# Patient Record
Sex: FEMALE | Race: WHITE | URBAN - METROPOLITAN AREA
[De-identification: names, ages, dates, MRNs, and addresses within clinical notes are randomized per-mention and may not be internally consistent; named-entity substitution may affect disease eponyms.]

---

## 2019-11-07 ENCOUNTER — APPOINTMENT (RX ONLY)
Dept: URBAN - METROPOLITAN AREA CLINIC 166 | Facility: CLINIC | Age: 39
Setting detail: DERMATOLOGY
End: 2019-11-07

## 2019-11-07 DIAGNOSIS — D22 MELANOCYTIC NEVI: ICD-10-CM

## 2019-11-07 DIAGNOSIS — Z71.89 OTHER SPECIFIED COUNSELING: ICD-10-CM

## 2019-11-07 DIAGNOSIS — L57.0 ACTINIC KERATOSIS: ICD-10-CM

## 2019-11-07 DIAGNOSIS — Z85.820 PERSONAL HISTORY OF MALIGNANT MELANOMA OF SKIN: ICD-10-CM

## 2019-11-07 DIAGNOSIS — D18.0 HEMANGIOMA: ICD-10-CM

## 2019-11-07 DIAGNOSIS — L82.0 INFLAMED SEBORRHEIC KERATOSIS: ICD-10-CM

## 2019-11-07 DIAGNOSIS — L82.1 OTHER SEBORRHEIC KERATOSIS: ICD-10-CM

## 2019-11-07 DIAGNOSIS — L81.4 OTHER MELANIN HYPERPIGMENTATION: ICD-10-CM

## 2019-11-07 PROBLEM — D22.72 MELANOCYTIC NEVI OF LEFT LOWER LIMB, INCLUDING HIP: Status: ACTIVE | Noted: 2019-11-07

## 2019-11-07 PROBLEM — D22.71 MELANOCYTIC NEVI OF RIGHT LOWER LIMB, INCLUDING HIP: Status: ACTIVE | Noted: 2019-11-07

## 2019-11-07 PROBLEM — D22.62 MELANOCYTIC NEVI OF LEFT UPPER LIMB, INCLUDING SHOULDER: Status: ACTIVE | Noted: 2019-11-07

## 2019-11-07 PROBLEM — D22.61 MELANOCYTIC NEVI OF RIGHT UPPER LIMB, INCLUDING SHOULDER: Status: ACTIVE | Noted: 2019-11-07

## 2019-11-07 PROBLEM — D18.01 HEMANGIOMA OF SKIN AND SUBCUTANEOUS TISSUE: Status: ACTIVE | Noted: 2019-11-07

## 2019-11-07 PROBLEM — D22.5 MELANOCYTIC NEVI OF TRUNK: Status: ACTIVE | Noted: 2019-11-07

## 2019-11-07 PROCEDURE — ? LIQUID NITROGEN

## 2019-11-07 PROCEDURE — 17110 DESTRUCTION B9 LES UP TO 14: CPT

## 2019-11-07 PROCEDURE — ? COUNSELING

## 2019-11-07 PROCEDURE — ? PRESCRIPTION

## 2019-11-07 PROCEDURE — 99203 OFFICE O/P NEW LOW 30 MIN: CPT | Mod: 25

## 2019-11-07 PROCEDURE — ? TREATMENT REGIMEN

## 2019-11-07 RX ORDER — FLUOROURACIL 2 G/40G
CREAM TOPICAL
Qty: 1 | Refills: 1 | Status: ERX | COMMUNITY
Start: 2019-11-07

## 2019-11-07 RX ADMIN — FLUOROURACIL: 2 CREAM TOPICAL at 00:00

## 2019-11-07 ASSESSMENT — LOCATION DETAILED DESCRIPTION DERM
LOCATION DETAILED: RIGHT MID-UPPER BACK
LOCATION DETAILED: RIGHT PROXIMAL POSTERIOR UPPER ARM
LOCATION DETAILED: RIGHT SUPERIOR MEDIAL UPPER BACK
LOCATION DETAILED: LEFT DISTAL CALF
LOCATION DETAILED: UPPER STERNUM
LOCATION DETAILED: LEFT POSTERIOR SHOULDER
LOCATION DETAILED: RIGHT MEDIAL UPPER BACK
LOCATION DETAILED: SUPERIOR THORACIC SPINE
LOCATION DETAILED: PERIUMBILICAL SKIN
LOCATION DETAILED: RIGHT SUPERIOR MEDIAL MIDBACK
LOCATION DETAILED: RIGHT POSTERIOR SHOULDER
LOCATION DETAILED: NASAL DORSUM
LOCATION DETAILED: RIGHT INFERIOR UPPER BACK
LOCATION DETAILED: LEFT PROXIMAL POSTERIOR UPPER ARM
LOCATION DETAILED: RIGHT DISTAL CALF
LOCATION DETAILED: MID TRAPEZIAL NECK
LOCATION DETAILED: RIGHT LATERAL UPPER BACK
LOCATION DETAILED: LEFT SUPERIOR LATERAL UPPER BACK
LOCATION DETAILED: LEFT MID-UPPER BACK
LOCATION DETAILED: LEFT SUPERIOR MEDIAL MIDBACK
LOCATION DETAILED: LEFT MEDIAL UPPER BACK

## 2019-11-07 ASSESSMENT — LOCATION SIMPLE DESCRIPTION DERM
LOCATION SIMPLE: LEFT POSTERIOR UPPER ARM
LOCATION SIMPLE: RIGHT UPPER BACK
LOCATION SIMPLE: RIGHT POSTERIOR UPPER ARM
LOCATION SIMPLE: LEFT CALF
LOCATION SIMPLE: ABDOMEN
LOCATION SIMPLE: LEFT UPPER BACK
LOCATION SIMPLE: NOSE
LOCATION SIMPLE: CHEST
LOCATION SIMPLE: UPPER BACK
LOCATION SIMPLE: LEFT LOWER BACK
LOCATION SIMPLE: RIGHT LOWER BACK
LOCATION SIMPLE: RIGHT SHOULDER
LOCATION SIMPLE: LEFT SHOULDER
LOCATION SIMPLE: TRAPEZIAL NECK
LOCATION SIMPLE: RIGHT CALF

## 2019-11-07 ASSESSMENT — LOCATION ZONE DERM
LOCATION ZONE: NECK
LOCATION ZONE: ARM
LOCATION ZONE: LEG
LOCATION ZONE: NOSE
LOCATION ZONE: TRUNK

## 2019-11-07 NOTE — PROCEDURE: LIQUID NITROGEN
Include Z78.9 (Other Specified Conditions Influencing Health Status) As An Associated Diagnosis?: Yes
Consent: The patient's consent was obtained including but not limited to risks of crusting, scabbing, blistering, scarring, darker or lighter pigmentary change, recurrence, incomplete removal and infection.
Detail Level: Simple
Post-Care Instructions: I reviewed with the patient in detail post-care instructions. Patient is to wear sunprotection, and avoid picking at any of the treated lesions. Pt may apply Vaseline to crusted or scabbing areas.
Render Post-Care Instructions In Note?: no
Number Of Freeze-Thaw Cycles: 1 freeze-thaw cycle
Medical Necessity Information: It is in your best interest to select a reason for this procedure from the list below. All of these items fulfill various CMS LCD requirements except the new and changing color options.
Medical Necessity Clause: This procedure was medically necessary because the lesions that were treated were:irritated by picking

## 2019-11-07 NOTE — HPI: MELANOMA F/U (HISTORY OF MALIGNANT MELANOMA)
What Is The Reason For Today's Visit?: History of Melanoma
Year Excised?: Approximately 1999 per patient

## 2019-11-07 NOTE — PROCEDURE: TREATMENT REGIMEN
Initiate Treatment: Efudex 5% cream to the nose nightly x 3 weeks. Wash off in the morning.
Detail Level: Detailed

## 2021-05-21 ENCOUNTER — APPOINTMENT (RX ONLY)
Dept: URBAN - METROPOLITAN AREA CLINIC 170 | Facility: CLINIC | Age: 41
Setting detail: DERMATOLOGY
End: 2021-05-21

## 2021-05-21 ENCOUNTER — RX ONLY (OUTPATIENT)
Age: 41
Setting detail: RX ONLY
End: 2021-05-21

## 2021-05-21 DIAGNOSIS — Z85.820 PERSONAL HISTORY OF MALIGNANT MELANOMA OF SKIN: ICD-10-CM

## 2021-05-21 DIAGNOSIS — L57.0 ACTINIC KERATOSIS: ICD-10-CM | Status: INADEQUATELY CONTROLLED

## 2021-05-21 DIAGNOSIS — L81.4 OTHER MELANIN HYPERPIGMENTATION: ICD-10-CM

## 2021-05-21 DIAGNOSIS — L82.1 OTHER SEBORRHEIC KERATOSIS: ICD-10-CM

## 2021-05-21 DIAGNOSIS — D18.0 HEMANGIOMA: ICD-10-CM

## 2021-05-21 DIAGNOSIS — D22 MELANOCYTIC NEVI: ICD-10-CM

## 2021-05-21 PROBLEM — D22.71 MELANOCYTIC NEVI OF RIGHT LOWER LIMB, INCLUDING HIP: Status: ACTIVE | Noted: 2021-05-21

## 2021-05-21 PROBLEM — D22.72 MELANOCYTIC NEVI OF LEFT LOWER LIMB, INCLUDING HIP: Status: ACTIVE | Noted: 2021-05-21

## 2021-05-21 PROBLEM — D22.62 MELANOCYTIC NEVI OF LEFT UPPER LIMB, INCLUDING SHOULDER: Status: ACTIVE | Noted: 2021-05-21

## 2021-05-21 PROBLEM — D18.01 HEMANGIOMA OF SKIN AND SUBCUTANEOUS TISSUE: Status: ACTIVE | Noted: 2021-05-21

## 2021-05-21 PROBLEM — D22.61 MELANOCYTIC NEVI OF RIGHT UPPER LIMB, INCLUDING SHOULDER: Status: ACTIVE | Noted: 2021-05-21

## 2021-05-21 PROBLEM — D22.5 MELANOCYTIC NEVI OF TRUNK: Status: ACTIVE | Noted: 2021-05-21

## 2021-05-21 PROCEDURE — 99213 OFFICE O/P EST LOW 20 MIN: CPT

## 2021-05-21 PROCEDURE — ? TREATMENT REGIMEN

## 2021-05-21 PROCEDURE — ? MEDICAL PHOTOGRAPHY REVIEW

## 2021-05-21 PROCEDURE — ? COUNSELING

## 2021-05-21 RX ORDER — FLUOROURACIL 2 G/40G
CREAM TOPICAL
Qty: 1 | Refills: 0 | Status: ERX | COMMUNITY
Start: 2021-05-21

## 2021-05-21 ASSESSMENT — LOCATION ZONE DERM
LOCATION ZONE: ARM
LOCATION ZONE: LEG
LOCATION ZONE: TRUNK
LOCATION ZONE: NECK
LOCATION ZONE: NOSE

## 2021-05-21 ASSESSMENT — LOCATION DETAILED DESCRIPTION DERM
LOCATION DETAILED: SUPERIOR THORACIC SPINE
LOCATION DETAILED: LEFT PROXIMAL PRETIBIAL REGION
LOCATION DETAILED: RIGHT POSTERIOR SHOULDER
LOCATION DETAILED: RIGHT PROXIMAL POSTERIOR UPPER ARM
LOCATION DETAILED: INFERIOR THORACIC SPINE
LOCATION DETAILED: LEFT PROXIMAL POSTERIOR UPPER ARM
LOCATION DETAILED: RIGHT DISTAL CALF
LOCATION DETAILED: LEFT ANTERIOR PROXIMAL UPPER ARM
LOCATION DETAILED: NASAL DORSUM
LOCATION DETAILED: LEFT DISTAL CALF
LOCATION DETAILED: LEFT POSTERIOR SHOULDER
LOCATION DETAILED: MID TRAPEZIAL NECK
LOCATION DETAILED: RIGHT SUPERIOR MEDIAL UPPER BACK
LOCATION DETAILED: UPPER STERNUM
LOCATION DETAILED: RIGHT PROXIMAL PRETIBIAL REGION
LOCATION DETAILED: RIGHT ANTERIOR PROXIMAL UPPER ARM
LOCATION DETAILED: PERIUMBILICAL SKIN

## 2021-05-21 ASSESSMENT — LOCATION SIMPLE DESCRIPTION DERM
LOCATION SIMPLE: LEFT PRETIBIAL REGION
LOCATION SIMPLE: LEFT UPPER ARM
LOCATION SIMPLE: UPPER BACK
LOCATION SIMPLE: RIGHT CALF
LOCATION SIMPLE: RIGHT SHOULDER
LOCATION SIMPLE: NOSE
LOCATION SIMPLE: TRAPEZIAL NECK
LOCATION SIMPLE: CHEST
LOCATION SIMPLE: RIGHT PRETIBIAL REGION
LOCATION SIMPLE: ABDOMEN
LOCATION SIMPLE: RIGHT POSTERIOR UPPER ARM
LOCATION SIMPLE: RIGHT UPPER BACK
LOCATION SIMPLE: LEFT SHOULDER
LOCATION SIMPLE: RIGHT UPPER ARM
LOCATION SIMPLE: LEFT CALF
LOCATION SIMPLE: LEFT POSTERIOR UPPER ARM

## 2021-08-18 ENCOUNTER — APPOINTMENT (RX ONLY)
Dept: URBAN - METROPOLITAN AREA CLINIC 173 | Facility: CLINIC | Age: 41
Setting detail: DERMATOLOGY
End: 2021-08-18

## 2021-08-18 DIAGNOSIS — Z41.9 ENCOUNTER FOR PROCEDURE FOR PURPOSES OTHER THAN REMEDYING HEALTH STATE, UNSPECIFIED: ICD-10-CM

## 2021-08-18 PROCEDURE — ? PALOMAR ICON LASER

## 2021-08-18 PROCEDURE — ? OTHER (COSMETIC)

## 2021-08-18 PROCEDURE — ? COSMETIC CONSULTATION: FILLERS

## 2021-08-18 PROCEDURE — ? BOTOX

## 2021-08-18 PROCEDURE — ? COSMETIC CONSULTATION: BOTULINUM TOXIN

## 2021-08-18 PROCEDURE — ? TREATMENT REGIMEN

## 2021-08-18 PROCEDURE — ? COSMETIC CONSULTATION: IPL

## 2021-08-18 ASSESSMENT — LOCATION DETAILED DESCRIPTION DERM
LOCATION DETAILED: LEFT INFERIOR CENTRAL MALAR CHEEK
LOCATION DETAILED: LEFT INFERIOR ANTERIOR NECK
LOCATION DETAILED: MIDDLE STERNUM

## 2021-08-18 ASSESSMENT — LOCATION SIMPLE DESCRIPTION DERM
LOCATION SIMPLE: LEFT CHEEK
LOCATION SIMPLE: CHEST
LOCATION SIMPLE: LEFT ANTERIOR NECK

## 2021-08-18 ASSESSMENT — LOCATION ZONE DERM
LOCATION ZONE: NECK
LOCATION ZONE: TRUNK
LOCATION ZONE: FACE

## 2021-08-18 NOTE — PROCEDURE: OTHER (COSMETIC)
Other (Free Text): Patient advised to see Ophthalmology for possible Occular Rosacea , referring over to Dr. Judi Vogt.
Detail Level: Zone

## 2021-08-18 NOTE — PROCEDURE: BOTOX
Show Anterior Platysmal Band Units: Yes
Additional Area 2 Units: 0
Consent: Written consent obtained. Risks include but not limited to lid/brow ptosis, bruising, swelling, diplopia, temporary effect, incomplete chemical denervation.
Lot #: [id number]
Show Ucl Units: No
Detail Level: Zone
Additional Area 3 Location: Chin
Post-Care Instructions: Patient instructed to not lie down for 4 hours and limit physical activity for 24 hours. Patient instructed not to travel by airplane for 48 hours.
Dilution (U/0.1 Cc): 1
Additional Area 1 Location: Face
Additional Area 1 Units: 32
Expiration Date (Month Year): 10/23
Additional Area 2 Location: Upper cutaneous lip
Price (Use Numbers Only, No Special Characters Or $): 881

## 2021-08-18 NOTE — PROCEDURE: PALOMAR ICON LASER
Fluence: 26
Overlap: 10%
Location: full face
Detail Level: Zone
Post-Care Instructions: I reviewed with the patient in detail post-care instructions. Patient should stay away from the sun and wear sun protection until treated areas are fully healed.
Price (Use Numbers Only, No Special Characters Or $): 526
Fluence Units: J/cm2
Anesthesia Volume In Cc: 0
Treatment Number: 1
External Cooling Fan Speed: 5
Pulse Duration (In Milliseconds): 36
Location Override: mid cheeks
Pulse Duration (In Milliseconds): 20
Consent: Written consent obtained, risks reviewed including but not limited to crusting, scabbing, blistering, scarring, darker or lighter pigmentary change, bruising, and/or incomplete response.
Fluence: 20
Treated Area: small area
Render Post Care In The Note?: no
Pre-Procedure Text: The patients skin was cleaned and lux lotion applied.
Location: neck
Location: decolletage of the chest
Anesthesia Type: 1% lidocaine with epinephrine
Fluence: 24
External Cooling: Ron Cryo 6

## 2021-08-18 NOTE — HPI: COSMETIC (IPL)
Have You Had Laser Removal Of Brown Spots Before?: has not had previous treatment
When Outside In The Sun, Do You...: always burns, tans with difficulty
Eye Color: blue
MD//MARIN/LAVON

## 2021-08-18 NOTE — PROCEDURE: TREATMENT REGIMEN
Detail Level: Zone
Initiate Treatment: A.M. wash with a gentle cleanser ( Cerave ), Elta 44 non-tinted.\\nP.M. wash with gentle cleanser ( Cerave ), then apply Cerave P.M.\\n\\n- patient advised to use Aquaphor around eyes due to sensitivity and irritation.

## 2021-09-15 ENCOUNTER — APPOINTMENT (RX ONLY)
Dept: URBAN - METROPOLITAN AREA CLINIC 173 | Facility: CLINIC | Age: 41
Setting detail: DERMATOLOGY
End: 2021-09-15

## 2021-09-15 DIAGNOSIS — Z41.9 ENCOUNTER FOR PROCEDURE FOR PURPOSES OTHER THAN REMEDYING HEALTH STATE, UNSPECIFIED: ICD-10-CM

## 2021-09-15 DIAGNOSIS — L24 IRRITANT CONTACT DERMATITIS: ICD-10-CM

## 2021-09-15 PROBLEM — L24.9 IRRITANT CONTACT DERMATITIS, UNSPECIFIED CAUSE: Status: ACTIVE | Noted: 2021-09-15

## 2021-09-15 PROCEDURE — ? BOTOX

## 2021-09-15 PROCEDURE — ? PATIENT SPECIFIC COUNSELING

## 2021-09-15 ASSESSMENT — SEVERITY ASSESSMENT 2020: SEVERITY 2020: CLEAR

## 2021-09-15 ASSESSMENT — LOCATION SIMPLE DESCRIPTION DERM: LOCATION SIMPLE: INFERIOR FOREHEAD

## 2021-09-15 ASSESSMENT — LOCATION DETAILED DESCRIPTION DERM: LOCATION DETAILED: INFERIOR MID FOREHEAD

## 2021-09-15 ASSESSMENT — LOCATION ZONE DERM: LOCATION ZONE: FACE

## 2021-09-15 NOTE — PROCEDURE: BOTOX
Show Ucl Units: No
Levator Labii Superioris Units: 0
Additional Area 3 Location: Chin
Show Additional Area 6: Yes
Dilution (U/0.1 Cc): 1
Consent: Written consent obtained. Risks include but not limited to lid/brow ptosis, bruising, swelling, diplopia, temporary effect, incomplete chemical denervation.
Forehead Units: 0.5
Post-Care Instructions: Patient instructed to not lie down for 4 hours and limit physical activity for 24 hours. Patient instructed not to travel by airplane for 48 hours.
Additional Area 1 Location: Face
Expiration Date (Month Year): 10/23
Additional Area 2 Location: Upper cutaneous lip
Detail Level: Zone
Lot #: [id number]

## 2021-09-15 NOTE — PROCEDURE: PATIENT SPECIFIC COUNSELING
Resolved with minimization of products, and changing t fragrance free, gentle lines. Patient is washing with CeraVe Foaming Cleanser, using Elta SPF, and applying SkinBetter Trio at night
Detail Level: Zone

## 2021-09-22 ENCOUNTER — APPOINTMENT (RX ONLY)
Dept: URBAN - METROPOLITAN AREA CLINIC 173 | Facility: CLINIC | Age: 41
Setting detail: DERMATOLOGY
End: 2021-09-22

## 2021-09-22 DIAGNOSIS — Z41.9 ENCOUNTER FOR PROCEDURE FOR PURPOSES OTHER THAN REMEDYING HEALTH STATE, UNSPECIFIED: ICD-10-CM

## 2021-09-22 PROCEDURE — ? PALOMAR ICON LASER

## 2021-09-22 ASSESSMENT — LOCATION DETAILED DESCRIPTION DERM
LOCATION DETAILED: MIDDLE STERNUM
LOCATION DETAILED: LEFT INFERIOR CENTRAL MALAR CHEEK
LOCATION DETAILED: LEFT INFERIOR ANTERIOR NECK

## 2021-09-22 ASSESSMENT — LOCATION SIMPLE DESCRIPTION DERM
LOCATION SIMPLE: LEFT CHEEK
LOCATION SIMPLE: LEFT ANTERIOR NECK
LOCATION SIMPLE: CHEST

## 2021-09-22 ASSESSMENT — LOCATION ZONE DERM
LOCATION ZONE: FACE
LOCATION ZONE: TRUNK
LOCATION ZONE: NECK

## 2021-09-22 NOTE — PROCEDURE: PALOMAR ICON LASER
Fluence: 32
Overlap: 10%
Location: full face
Detail Level: Zone
Post-Care Instructions: I reviewed with the patient in detail post-care instructions. Patient should stay away from the sun and wear sun protection until treated areas are fully healed.
Price (Use Numbers Only, No Special Characters Or $): 634
Fluence Units: J/cm2
Anesthesia Volume In Cc: 0
Treatment Number: 2
External Cooling Fan Speed: 5
Pulse Duration (In Milliseconds): 32
Location Override: mid cheeks
Pulse Duration (In Milliseconds): 15
Consent: Written consent obtained, risks reviewed including but not limited to crusting, scabbing, blistering, scarring, darker or lighter pigmentary change, bruising, and/or incomplete response.
Fluence: 10
Treated Area: small area
Total Pulses (Will Not Render If Blank): 149
Number Of Passes: 1
Render Post Care In The Note?: no
Pre-Procedure Text: The patients skin was cleaned and lux lotion applied.
Location: neck
Location: decolletage of the chest
Anesthesia Type: 1% lidocaine with epinephrine
Fluence: 26
External Cooling: Ron Cryo 6

## 2022-02-23 ENCOUNTER — APPOINTMENT (RX ONLY)
Dept: URBAN - METROPOLITAN AREA CLINIC 173 | Facility: CLINIC | Age: 42
Setting detail: DERMATOLOGY
End: 2022-02-23

## 2022-02-23 DIAGNOSIS — L71.0 PERIORAL DERMATITIS: ICD-10-CM

## 2022-02-23 DIAGNOSIS — Z41.9 ENCOUNTER FOR PROCEDURE FOR PURPOSES OTHER THAN REMEDYING HEALTH STATE, UNSPECIFIED: ICD-10-CM

## 2022-02-23 PROBLEM — L30.9 DERMATITIS, UNSPECIFIED: Status: ACTIVE | Noted: 2022-02-23

## 2022-02-23 PROCEDURE — ? PATIENT SPECIFIC COUNSELING

## 2022-02-23 PROCEDURE — ? BOTOX

## 2022-02-23 ASSESSMENT — LOCATION DETAILED DESCRIPTION DERM
LOCATION DETAILED: LEFT MEDIAL MALAR CHEEK
LOCATION DETAILED: LEFT MEDIAL BUCCAL CHEEK
LOCATION DETAILED: RIGHT LOWER CUTANEOUS LIP

## 2022-02-23 ASSESSMENT — LOCATION ZONE DERM
LOCATION ZONE: LIP
LOCATION ZONE: FACE

## 2022-02-23 ASSESSMENT — LOCATION SIMPLE DESCRIPTION DERM
LOCATION SIMPLE: RIGHT LIP
LOCATION SIMPLE: LEFT CHEEK

## 2022-02-23 NOTE — PROCEDURE: BOTOX
Lateral Platysmal Bands Units: 0
Lot #: [id number]
Show Additional Area 4: Yes
Additional Area 3 Location: masseters
Expiration Date (Month Year): 4/24
Dilution (U/0.1 Cc): 1
Show Mentalis Units: No
Price (Use Numbers Only, No Special Characters Or $): 842
Additional Area 1 Location: Face
Consent: Written consent obtained. Risks include but not limited to lid/brow ptosis, bruising, swelling, diplopia, temporary effect, incomplete chemical denervation.
Additional Area 1 Units: 31
Additional Area 2 Location: Upper cutaneous lip
Detail Level: Zone
Post-Care Instructions: Patient instructed to not lie down for 4 hours and limit physical activity for 24 hours. Patient instructed not to travel by airplane for 48 hours.
Yes

## 2022-02-23 NOTE — PROCEDURE: PATIENT SPECIFIC COUNSELING
Detail Level: Zone
Patient advised to use OTC Hydrocortisone daily to affect area, patient aware that the steroid could possibly cause rebound in PD.  Patient to use bid for 2 days, then QD for a few days, then every other day for a few days then stop. She was advised to follow up with Dr. Leonardo for further evaluation and management.

## 2022-05-20 ENCOUNTER — APPOINTMENT (RX ONLY)
Dept: URBAN - METROPOLITAN AREA CLINIC 173 | Facility: CLINIC | Age: 42
Setting detail: DERMATOLOGY
End: 2022-05-20

## 2022-05-20 DIAGNOSIS — Z41.9 ENCOUNTER FOR PROCEDURE FOR PURPOSES OTHER THAN REMEDYING HEALTH STATE, UNSPECIFIED: ICD-10-CM

## 2022-05-20 PROCEDURE — ? PALOMAR ICON LASER

## 2022-05-20 ASSESSMENT — LOCATION SIMPLE DESCRIPTION DERM
LOCATION SIMPLE: LEFT ANTERIOR NECK
LOCATION SIMPLE: LEFT CHEEK
LOCATION SIMPLE: CHEST

## 2022-05-20 ASSESSMENT — LOCATION ZONE DERM
LOCATION ZONE: NECK
LOCATION ZONE: TRUNK
LOCATION ZONE: FACE

## 2022-05-20 ASSESSMENT — LOCATION DETAILED DESCRIPTION DERM
LOCATION DETAILED: LEFT INFERIOR ANTERIOR NECK
LOCATION DETAILED: MIDDLE STERNUM
LOCATION DETAILED: LEFT INFERIOR CENTRAL MALAR CHEEK

## 2022-05-20 NOTE — HPI: COSMETIC (IPL)
Have You Had Laser Removal Of Brown Spots Before?: has had previous treatment
When Was Your Last Ipl Treatment?: 4/11/22

## 2022-05-20 NOTE — PROCEDURE: PALOMAR ICON LASER
Fluence: 30
Overlap: 10%
Location: full face
Detail Level: Zone
Post-Care Instructions: I reviewed with the patient in detail post-care instructions. Patient should stay away from the sun and wear sun protection until treated areas are fully healed.
Price (Use Numbers Only, No Special Characters Or $): 132
Fluence Units: J/cm2
Anesthesia Volume In Cc: 0
Treatment Number: 3
External Cooling Fan Speed: 5
Pulse Duration (In Milliseconds): 34
Location Override: mid cheeks
Pulse Duration (In Milliseconds): 15
Consent: Written consent obtained, risks reviewed including but not limited to crusting, scabbing, blistering, scarring, darker or lighter pigmentary change, bruising, and/or incomplete response.
Fluence: 10
Treated Area: small area
Total Pulses (Will Not Render If Blank): 165
Number Of Passes: 1
Render Post Care In The Note?: no
Pre-Procedure Text: The patients skin was cleaned and lux lotion applied.
Location: neck
Location: decolletage of the chest
Anesthesia Type: 1% lidocaine with epinephrine
Fluence: 28
External Cooling: Ron Cryo 6

## 2022-06-09 ENCOUNTER — APPOINTMENT (RX ONLY)
Dept: URBAN - METROPOLITAN AREA CLINIC 173 | Facility: CLINIC | Age: 42
Setting detail: DERMATOLOGY
End: 2022-06-09

## 2022-06-09 DIAGNOSIS — Z41.9 ENCOUNTER FOR PROCEDURE FOR PURPOSES OTHER THAN REMEDYING HEALTH STATE, UNSPECIFIED: ICD-10-CM

## 2022-06-09 PROCEDURE — ? TREATMENT REGIMEN

## 2022-06-09 PROCEDURE — ? BOTOX

## 2022-06-09 NOTE — PROCEDURE: TREATMENT REGIMEN
Plan: AM\\n- [place]\\n-  silk shield SPF 40\\n\\nPM\\n- Alastin restorative\\n- MD Skin essentials NiaRet\\n\\n*may substitute Alastin for [place] when not having prolonged sun exposure. \\n\\nGiven patient samples of Silk Shield 40 SPF, [employer],
Detail Level: Simple

## 2022-06-09 NOTE — PROCEDURE: BOTOX
Additional Area 3 Location: masseters
Price (Use Numbers Only, No Special Characters Or $): 875
Show Additional Area 4: Yes
Dilution (U/0.1 Cc): 1
Levator Labii Superioris Units: 0
Consent: Written consent obtained. Risks include but not limited to lid/brow ptosis, bruising, swelling, diplopia, temporary effect, incomplete chemical denervation.
Show Ucl Units: No
Additional Area 1 Units: 29
Additional Area 1 Location: Face
Additional Area 2 Location: Upper cutaneous lip
Post-Care Instructions: Patient instructed to not lie down for 4 hours and limit physical activity for 24 hours. Patient instructed not to travel by airplane for 48 hours.
Lot #: [id number]
Expiration Date (Month Year): 07/24
Detail Level: Zone

## 2022-08-18 ENCOUNTER — APPOINTMENT (RX ONLY)
Dept: URBAN - METROPOLITAN AREA CLINIC 173 | Facility: CLINIC | Age: 42
Setting detail: DERMATOLOGY
End: 2022-08-18

## 2022-08-18 DIAGNOSIS — Z41.9 ENCOUNTER FOR PROCEDURE FOR PURPOSES OTHER THAN REMEDYING HEALTH STATE, UNSPECIFIED: ICD-10-CM

## 2022-08-18 PROCEDURE — ? PALOMAR ICON LASER

## 2022-08-18 ASSESSMENT — LOCATION DETAILED DESCRIPTION DERM
LOCATION DETAILED: LEFT INFERIOR CENTRAL MALAR CHEEK
LOCATION DETAILED: MIDDLE STERNUM
LOCATION DETAILED: LEFT INFERIOR ANTERIOR NECK

## 2022-08-18 ASSESSMENT — LOCATION SIMPLE DESCRIPTION DERM
LOCATION SIMPLE: CHEST
LOCATION SIMPLE: LEFT CHEEK
LOCATION SIMPLE: LEFT ANTERIOR NECK

## 2022-08-18 ASSESSMENT — LOCATION ZONE DERM
LOCATION ZONE: FACE
LOCATION ZONE: TRUNK
LOCATION ZONE: NECK

## 2022-08-18 NOTE — PROCEDURE: PALOMAR ICON LASER
Fluence: 38
Overlap: 10%
Location: full face
Detail Level: Zone
Post-Care Instructions: I reviewed with the patient in detail post-care instructions. Patient should stay away from the sun and wear sun protection until treated areas are fully healed.
Price (Use Numbers Only, No Special Characters Or $): 421
Location Override: n
Fluence Units: J/cm2
Location Override: forehead
Anesthesia Volume In Cc: 0
Treatment Number: 4
External Cooling Fan Speed: 5
Pulse Duration (In Milliseconds): 34
Location Override: mid cheeks
Pulse Duration (In Milliseconds): 15
Consent: Written consent obtained, risks reviewed including but not limited to crusting, scabbing, blistering, scarring, darker or lighter pigmentary change, bruising, and/or incomplete response.
Fluence: 10
Treated Area: small area
Total Pulses (Will Not Render If Blank): 119
Number Of Passes: 1
Render Post Care In The Note?: no
Pre-Procedure Text: The patients skin was cleaned and lux lotion applied.
Location: lower face
Location: decolletage of the chest
Anesthesia Type: 1% lidocaine with epinephrine
Fluence: 40
External Cooling: Ron Cryo 6
Location Override: lateral chest
Fluence Units: mJ/cm2
Fluence: 30
Total Pulses (Will Not Render If Blank): 198
Fluence: 34
Location: neck
Fluence: 32

## 2022-08-18 NOTE — HPI: COSMETIC (IPL)
Have You Had Laser Removal Of Brown Spots Before?: has had previous treatment
When Was Your Last Ipl Treatment?: 5/20/22

## 2022-09-23 ENCOUNTER — APPOINTMENT (RX ONLY)
Dept: URBAN - METROPOLITAN AREA CLINIC 173 | Facility: CLINIC | Age: 42
Setting detail: DERMATOLOGY
End: 2022-09-23

## 2022-09-23 DIAGNOSIS — Z41.9 ENCOUNTER FOR PROCEDURE FOR PURPOSES OTHER THAN REMEDYING HEALTH STATE, UNSPECIFIED: ICD-10-CM

## 2022-09-23 DIAGNOSIS — L64.8 OTHER ANDROGENIC ALOPECIA: ICD-10-CM

## 2022-09-23 PROCEDURE — ? BOTOX

## 2022-09-23 PROCEDURE — ? COUNSELING

## 2022-09-23 NOTE — PROCEDURE: COUNSELING
Detail Level: Zone
Patient Specific Counseling (Will Not Stick From Patient To Patient): Patient states she has been on Nutraful for the last 3 months. Discussed patient using Women’s Rogaine 5% foam and referred to General Dermatology.

## 2022-09-23 NOTE — PROCEDURE: BOTOX
Show Forehead Units: Yes
Periorbital Skin Units: 0
Show Right And Left Pupillary Line Units: No
Expiration Date (Month Year): 06/24
Additional Area 1 Location: Face
Price (Use Numbers Only, No Special Characters Or $): 696
Consent: Written consent obtained. Risks include but not limited to lid/brow ptosis, bruising, swelling, diplopia, temporary effect, incomplete chemical denervation.
Additional Area 1 Units: 27
Post-Care Instructions: Patient instructed to not lie down for 4 hours and limit physical activity for 24 hours. Patient instructed not to travel by airplane for 48 hours.
Additional Area 2 Location: Upper cutaneous lip
Detail Level: Zone
Lot #: [id number]
Additional Area 3 Location: masseters
Dilution (U/0.1 Cc): 1

## 2022-12-01 ENCOUNTER — APPOINTMENT (RX ONLY)
Dept: URBAN - METROPOLITAN AREA CLINIC 173 | Facility: CLINIC | Age: 42
Setting detail: DERMATOLOGY
End: 2022-12-01

## 2022-12-01 DIAGNOSIS — Z41.9 ENCOUNTER FOR PROCEDURE FOR PURPOSES OTHER THAN REMEDYING HEALTH STATE, UNSPECIFIED: ICD-10-CM

## 2022-12-01 PROCEDURE — ? PALOMAR ICON LASER

## 2022-12-01 ASSESSMENT — LOCATION DETAILED DESCRIPTION DERM
LOCATION DETAILED: LEFT PROXIMAL DORSAL FOREARM
LOCATION DETAILED: RIGHT PROXIMAL DORSAL FOREARM
LOCATION DETAILED: LEFT RADIAL DORSAL HAND
LOCATION DETAILED: RIGHT RADIAL DORSAL HAND

## 2022-12-01 ASSESSMENT — LOCATION SIMPLE DESCRIPTION DERM
LOCATION SIMPLE: LEFT HAND
LOCATION SIMPLE: RIGHT HAND
LOCATION SIMPLE: RIGHT FOREARM
LOCATION SIMPLE: LEFT FOREARM

## 2022-12-01 ASSESSMENT — LOCATION ZONE DERM
LOCATION ZONE: HAND
LOCATION ZONE: ARM

## 2022-12-01 NOTE — PROCEDURE: PALOMAR ICON LASER
Consent: Written consent obtained, risks reviewed including but not limited to crusting, scabbing, blistering, scarring, darker or lighter pigmentary change, bruising, and/or incomplete response.
Fluence Units: J/cm2
Pulse Duration (In Milliseconds): 40
Location Override: left forearm
Anesthesia Type: 1% lidocaine with epinephrine
Location: neck
Number Of Passes: 1
Anesthesia Volume In Cc: 0
Price (Use Numbers Only, No Special Characters Or $): 811
Pulse Duration (In Milliseconds): 40
Render Post Care In The Note?: no
External Cooling Fan Speed: 5
Pulse Duration (In Milliseconds): 40
Detail Level: Zone
Treated Area: medium area
Fluence: 28
Overlap: 10%
Total Pulses (Will Not Render If Blank): 279
Fluence: 30
Location Override: upper arms, right forearm
stretcher
Location: decolletage of the chest
Fluence: 26
Location Override: hands
External Cooling: Ron Cryo 6
Pre-Procedure Text: The patients skin was cleaned and lux lotion applied.
Post-Care Instructions: I reviewed with the patient in detail post-care instructions. Patient should stay away from the sun and wear sun protection until treated areas are fully healed.

## 2022-12-01 NOTE — HPI: COSMETIC (IPL)
Have You Had Laser Removal Of Brown Spots Before?: has not had previous treatment
Additional History: She has had previous treatments, but this is her 1st treatment on her arms. She has had good results and no adverse side effects from previous treatments.

## 2023-01-01 NOTE — PROCEDURE: MEDICAL PHOTOGRAPHY REVIEW
Coby
Review Findings: no new or changing lesions
Detail Level: Zone
Number Of Photographs Reviewed: 6

## 2023-01-17 ENCOUNTER — APPOINTMENT (RX ONLY)
Dept: URBAN - METROPOLITAN AREA CLINIC 173 | Facility: CLINIC | Age: 43
Setting detail: DERMATOLOGY
End: 2023-01-17

## 2023-01-17 DIAGNOSIS — Z41.9 ENCOUNTER FOR PROCEDURE FOR PURPOSES OTHER THAN REMEDYING HEALTH STATE, UNSPECIFIED: ICD-10-CM

## 2023-01-17 PROCEDURE — ? BOTOX

## 2023-01-17 NOTE — PROCEDURE: BOTOX
Show Additional Area 2: Yes
[facility] Units: 0
Show Right And Left Brow Units: No
Expiration Date (Month Year): 4/25
Price (Use Numbers Only, No Special Characters Or $): 926
Additional Area 1 Location: Face
Consent: Written consent obtained. Risks include but not limited to lid/brow ptosis, bruising, swelling, diplopia, temporary effect, incomplete chemical denervation.
Additional Area 1 Units: 28
Additional Area 2 Location: Upper cutaneous lip
Post-Care Instructions: Patient instructed to not lie down for 4 hours and limit physical activity for 24 hours. Patient instructed not to travel by airplane for 48 hours.
Detail Level: Zone
Lot #: [id number]
Dilution (U/0.1 Cc): 1
Additional Area 3 Location: masseters

## 2023-01-26 ENCOUNTER — APPOINTMENT (RX ONLY)
Dept: URBAN - METROPOLITAN AREA CLINIC 173 | Facility: CLINIC | Age: 43
Setting detail: DERMATOLOGY
End: 2023-01-26

## 2023-01-26 DIAGNOSIS — Z41.9 ENCOUNTER FOR PROCEDURE FOR PURPOSES OTHER THAN REMEDYING HEALTH STATE, UNSPECIFIED: ICD-10-CM

## 2023-01-26 PROCEDURE — ? PALOMAR ICON LASER

## 2023-01-26 ASSESSMENT — LOCATION SIMPLE DESCRIPTION DERM
LOCATION SIMPLE: RIGHT HAND
LOCATION SIMPLE: LEFT HAND
LOCATION SIMPLE: LEFT FOREARM
LOCATION SIMPLE: RIGHT FOREARM

## 2023-01-26 ASSESSMENT — LOCATION DETAILED DESCRIPTION DERM
LOCATION DETAILED: LEFT PROXIMAL DORSAL FOREARM
LOCATION DETAILED: RIGHT RADIAL DORSAL HAND
LOCATION DETAILED: LEFT RADIAL DORSAL HAND
LOCATION DETAILED: RIGHT PROXIMAL DORSAL FOREARM

## 2023-01-26 ASSESSMENT — LOCATION ZONE DERM
LOCATION ZONE: ARM
LOCATION ZONE: HAND

## 2023-01-26 NOTE — PROCEDURE: PALOMAR ICON LASER
Consent: Written consent obtained, risks reviewed including but not limited to crusting, scabbing, blistering, scarring, darker or lighter pigmentary change, bruising, and/or incomplete response.
Fluence Units: J/cm2
Pulse Duration (In Milliseconds): 20
Location Override: lower arms
Anesthesia Type: 1% lidocaine with epinephrine
Location: neck
Number Of Passes: 1
Anesthesia Volume In Cc: 0
Price (Use Numbers Only, No Special Characters Or $): 350
Pulse Duration (In Milliseconds): 20
Treatment Number: 2
Render Post Care In The Note?: no
External Cooling Fan Speed: 5
Detail Level: Zone
Treated Area: medium area
Fluence: 22
Overlap: 10%
Total Pulses (Will Not Render If Blank): 723
Fluence: 28
Location Override: upper arms
Location: decolletage of the chest
Fluence: 26
Location Override: hands
External Cooling: Ron Cryo 6
Pre-Procedure Text: The patients skin was cleaned and lux lotion applied.
Post-Care Instructions: I reviewed with the patient in detail post-care instructions. Patient should stay away from the sun and wear sun protection until treated areas are fully healed.

## 2023-01-26 NOTE — HPI: COSMETIC (IPL)
Have You Had Laser Removal Of Brown Spots Before?: has had previous treatment
When Was Your Last Ipl Treatment?: 12/1/22

## 2023-03-23 ENCOUNTER — APPOINTMENT (RX ONLY)
Dept: URBAN - METROPOLITAN AREA CLINIC 173 | Facility: CLINIC | Age: 43
Setting detail: DERMATOLOGY
End: 2023-03-23

## 2023-03-23 DIAGNOSIS — Z41.9 ENCOUNTER FOR PROCEDURE FOR PURPOSES OTHER THAN REMEDYING HEALTH STATE, UNSPECIFIED: ICD-10-CM

## 2023-03-23 PROCEDURE — ? PALOMAR ICON LASER

## 2023-03-23 ASSESSMENT — LOCATION SIMPLE DESCRIPTION DERM
LOCATION SIMPLE: RIGHT HAND
LOCATION SIMPLE: RIGHT ANTERIOR NECK
LOCATION SIMPLE: LEFT HAND
LOCATION SIMPLE: CHEST
LOCATION SIMPLE: RIGHT FOREARM
LOCATION SIMPLE: LEFT FOREARM

## 2023-03-23 ASSESSMENT — LOCATION ZONE DERM
LOCATION ZONE: ARM
LOCATION ZONE: TRUNK
LOCATION ZONE: NECK
LOCATION ZONE: HAND

## 2023-03-23 ASSESSMENT — LOCATION DETAILED DESCRIPTION DERM
LOCATION DETAILED: UPPER STERNUM
LOCATION DETAILED: LEFT RADIAL DORSAL HAND
LOCATION DETAILED: RIGHT PROXIMAL DORSAL FOREARM
LOCATION DETAILED: LEFT PROXIMAL DORSAL FOREARM
LOCATION DETAILED: RIGHT SUPERIOR ANTERIOR NECK
LOCATION DETAILED: RIGHT RADIAL DORSAL HAND

## 2023-03-23 NOTE — PROCEDURE: PALOMAR ICON LASER
Consent: Written consent obtained, risks reviewed including but not limited to crusting, scabbing, blistering, scarring, darker or lighter pigmentary change, bruising, and/or incomplete response.
Fluence Units: J/cm2
Pulse Duration (In Milliseconds): 20
Location Override: lower arms
Anesthesia Type: 1% lidocaine with epinephrine
Location: neck
Number Of Passes: 1
Anesthesia Volume In Cc: 0
Price (Use Numbers Only, No Special Characters Or $): 638
Pulse Duration (In Milliseconds): 20
Treatment Number: 3
Render Post Care In The Note?: no
External Cooling Fan Speed: 5
Detail Level: Zone
Treated Area: medium area
Fluence: 28
Overlap: 10%
Total Pulses (Will Not Render If Blank): 516
Fluence: 32
Location Override: upper arms
Location: decolletage of the chest
Fluence: 30
Location Override: hands
External Cooling: Ron Cryo 6
Pre-Procedure Text: The patients skin was cleaned and lux lotion applied.
Post-Care Instructions: I reviewed with the patient in detail post-care instructions. Patient should stay away from the sun and wear sun protection until treated areas are fully healed.
Pulse Duration (In Milliseconds): 15
Fluence: 30
Total Pulses (Will Not Render If Blank): 225
Fluence: 26
Pulse Duration (In Milliseconds): 10
Price (Use Numbers Only, No Special Characters Or $): 400
Location: dorsal hands

## 2023-03-23 NOTE — HPI: COSMETIC (IPL)
Have You Had Laser Removal Of Brown Spots Before?: has had previous treatment
When Was Your Last Ipl Treatment?: 1/26/23

## 2023-05-05 ENCOUNTER — APPOINTMENT (RX ONLY)
Dept: URBAN - METROPOLITAN AREA CLINIC 173 | Facility: CLINIC | Age: 43
Setting detail: DERMATOLOGY
End: 2023-05-05

## 2023-05-05 DIAGNOSIS — Z41.9 ENCOUNTER FOR PROCEDURE FOR PURPOSES OTHER THAN REMEDYING HEALTH STATE, UNSPECIFIED: ICD-10-CM

## 2023-05-05 PROCEDURE — ? BOTOX

## 2023-05-05 PROCEDURE — ? COSMETIC CONSULTATION: BLEPHAROPLASTY

## 2023-05-05 NOTE — PROCEDURE: BOTOX
Additional Area 6 Units: 0
Show Lcl Units: No
Additional Area 3 Location: masseters
Show Additional Area 2: Yes
Detail Level: Zone
Expiration Date (Month Year): 10/25
Consent: Written consent obtained. Risks include but not limited to lid/brow ptosis, bruising, swelling, diplopia, temporary effect, incomplete chemical denervation.
Price (Use Numbers Only, No Special Characters Or $): 519
Additional Area 2 Location: Upper cutaneous lip
Lot #: [id number]
Post-Care Instructions: Patient instructed to not lie down for 4 hours and limit physical activity for 24 hours. Patient instructed not to travel by airplane for 48 hours.
Additional Area 1 Location: Face
Dilution (U/0.1 Cc): 1
Additional Area 1 Units: 39.5
Incrementing Botox Units: By 0.5 Units

## 2023-05-16 ENCOUNTER — APPOINTMENT (RX ONLY)
Dept: URBAN - METROPOLITAN AREA CLINIC 173 | Facility: CLINIC | Age: 43
Setting detail: DERMATOLOGY
End: 2023-05-16

## 2023-05-16 DIAGNOSIS — Z41.9 ENCOUNTER FOR PROCEDURE FOR PURPOSES OTHER THAN REMEDYING HEALTH STATE, UNSPECIFIED: ICD-10-CM

## 2023-05-16 PROCEDURE — ? PALOMAR ICON LASER

## 2023-05-16 ASSESSMENT — LOCATION ZONE DERM
LOCATION ZONE: NECK
LOCATION ZONE: TRUNK
LOCATION ZONE: FACE

## 2023-05-16 ASSESSMENT — LOCATION SIMPLE DESCRIPTION DERM
LOCATION SIMPLE: LEFT ANTERIOR NECK
LOCATION SIMPLE: CHEST
LOCATION SIMPLE: LEFT CHEEK

## 2023-05-16 NOTE — HPI: COSMETIC (IPL)
Have You Had Laser Removal Of Brown Spots Before?: has had previous treatment
When Was Your Last Ipl Treatment?: 3/23/23

## 2023-05-16 NOTE — PROCEDURE: PALOMAR ICON LASER
Fluence: 30
Overlap: 10%
Location: full face
Detail Level: Zone
Post-Care Instructions: I reviewed with the patient in detail post-care instructions. Patient should stay away from the sun and wear sun protection until treated areas are fully healed.
Price (Use Numbers Only, No Special Characters Or $): 508
Fluence Units: J/cm2
Anesthesia Volume In Cc: 0
Treatment Number: 5
Pulse Duration (In Milliseconds): 40
Pulse Duration (In Milliseconds): 15
Consent: Written consent obtained, risks reviewed including but not limited to crusting, scabbing, blistering, scarring, darker or lighter pigmentary change, bruising, and/or incomplete response.
Treated Area: small area
Total Pulses (Will Not Render If Blank): 156
Number Of Passes: 1
Render Post Care In The Note?: no
Pre-Procedure Text: The patients skin was cleaned and lux lotion applied.
Location: neck
Location: decolletage of the chest
Anesthesia Type: 1% lidocaine with epinephrine
Fluence: 26
External Cooling: Ron Cryo 6
Treatment Number: 4
Location Override: lateral chest
Fluence Units: mJ/cm2
Fluence: 30
Total Pulses (Will Not Render If Blank): 198
Fluence: 34
Fluence: 32

## 2023-09-05 ENCOUNTER — APPOINTMENT (RX ONLY)
Dept: URBAN - METROPOLITAN AREA CLINIC 173 | Facility: CLINIC | Age: 43
Setting detail: DERMATOLOGY
End: 2023-09-05

## 2023-09-05 DIAGNOSIS — Z41.9 ENCOUNTER FOR PROCEDURE FOR PURPOSES OTHER THAN REMEDYING HEALTH STATE, UNSPECIFIED: ICD-10-CM

## 2023-09-05 PROCEDURE — ? BOTOX

## 2023-09-05 NOTE — PROCEDURE: BOTOX
Show Additional Area 3: Yes
Dilution (U/0.1 Cc): 1
Left Periorbital Units: 0
Additional Area 1 Location: Face
Additional Area 1 Units: 42
Incrementing Botox Units: By 0.5 Units
Show Ucl Units: No
Additional Area 3 Location: masseters
Detail Level: Zone
Expiration Date (Month Year): 4/26
Additional Area 2 Location: Upper cutaneous lip
Price (Use Numbers Only, No Special Characters Or $): 933
Post-Care Instructions: Patient instructed to not lie down for 4 hours and limit physical activity for 24 hours. Patient instructed not to travel by airplane for 48 hours.
Consent: Written consent obtained. Risks include but not limited to lid/brow ptosis, bruising, swelling, diplopia, temporary effect, incomplete chemical denervation.
Lot #: [id number]

## 2023-11-07 ENCOUNTER — APPOINTMENT (RX ONLY)
Dept: URBAN - METROPOLITAN AREA CLINIC 173 | Facility: CLINIC | Age: 43
Setting detail: DERMATOLOGY
End: 2023-11-07

## 2023-11-07 DIAGNOSIS — Z41.9 ENCOUNTER FOR PROCEDURE FOR PURPOSES OTHER THAN REMEDYING HEALTH STATE, UNSPECIFIED: ICD-10-CM

## 2023-11-07 PROCEDURE — ? SCITON BBL HERO

## 2023-11-07 ASSESSMENT — LOCATION DETAILED DESCRIPTION DERM
LOCATION DETAILED: MIDDLE STERNUM
LOCATION DETAILED: RIGHT INFERIOR MEDIAL MALAR CHEEK
LOCATION DETAILED: RIGHT INFERIOR ANTERIOR NECK

## 2023-11-07 ASSESSMENT — LOCATION SIMPLE DESCRIPTION DERM
LOCATION SIMPLE: RIGHT CHEEK
LOCATION SIMPLE: RIGHT ANTERIOR NECK
LOCATION SIMPLE: CHEST

## 2023-11-07 ASSESSMENT — LOCATION ZONE DERM
LOCATION ZONE: TRUNK
LOCATION ZONE: FACE
LOCATION ZONE: NECK

## 2023-11-07 NOTE — PROCEDURE: SCITON BBL HERO
Detail Level: Zone
Price (Use Numbers Only, No Special Characters Or $): 193
Skin Type: II
Treatment Number: 1
Number Of Prepaid Treatments (Will Not Render If 0): 0
Filter: 515nm Filter
Spot Size: Finesse Adapter Size: 15 x 15 mm square
Pulse Width - Will Not Render If 0: 3
Pulse Width Units: milliseconds
Fluence (J/Cm2) - Will Not Render If 0: 7
Temp (C): 25
Passes (Optional): 2
Add Setting 2?: yes
Rate (Hz): 1.5
Filter: 560nm Filter
Total Pulses (Optional): 92
Spot Size: Finesse Adapter Size: 15 x 45 mm (No Finesse Adapter)
Total Pulses (Optional): 95
Pulse Width - Will Not Render If 0: 15
Temp (C): 20
Total Pulses (Optional): 44
Spot Size: Finesse Adapter Size: 7 mm round
Pulse Width - Will Not Render If 0: 10
Fluence (J/Cm2) - Will Not Render If 0: 20
Passes (Optional): 1-2
Rate (Hz): shot
Total Pulses (Optional): 118
Fluence (J/Cm2) - Will Not Render If 0: 6
Total Pulses (Optional): 204
Anesthesia Type: 1% lidocaine with epinephrine
Consent: Written consent obtained.  Risks reviewed including but not limited to crusting, scabbing, blistering, scarring, darker or lighter pigmentary change, and incomplete clearance.
Procedure Note: After applying gel and applying protective eyeware, treatment was administered using the setting parameters listed above.
Post-Care Instructions: I reviewed with the patient in detail post-care instructions. Patient should avoid sun exposure before and after treatment.  Patient should wear sunscreen.

## 2023-11-07 NOTE — HPI: COSMETIC (LASER BROWN SPOTS)
Have You Had Laser Removal Of Brown Spots Before?: has not had previous treatment
Additional History: She has had previous IPL treatments with good results, no adverse side effects.

## 2023-12-06 ENCOUNTER — APPOINTMENT (RX ONLY)
Dept: URBAN - METROPOLITAN AREA CLINIC 173 | Facility: CLINIC | Age: 43
Setting detail: DERMATOLOGY
End: 2023-12-06

## 2023-12-06 DIAGNOSIS — Z41.9 ENCOUNTER FOR PROCEDURE FOR PURPOSES OTHER THAN REMEDYING HEALTH STATE, UNSPECIFIED: ICD-10-CM

## 2023-12-06 PROCEDURE — ? BOTOX

## 2023-12-06 NOTE — PROCEDURE: BOTOX
Show Anterior Platysmal Band Units: Yes
Additional Area 4 Units: 0
Incrementing Botox Units: By 0.5 Units
Additional Area 1 Units: 37
Show Right And Left Pupillary Line Units: No
Additional Area 3 Location: masseters
Detail Level: Zone
Expiration Date (Month Year): 4/26
Price (Use Numbers Only, No Special Characters Or $): 252
Additional Area 2 Location: Upper cutaneous lip
Consent: Written consent obtained. Risks include but not limited to lid/brow ptosis, bruising, swelling, diplopia, temporary effect, incomplete chemical denervation.
Post-Care Instructions: Patient instructed to not lie down for 4 hours and limit physical activity for 24 hours. Patient instructed not to travel by airplane for 48 hours.
Lot #: [id number]
Dilution (U/0.1 Cc): 1
Additional Area 1 Location: Face

## 2024-01-12 ENCOUNTER — APPOINTMENT (RX ONLY)
Dept: URBAN - METROPOLITAN AREA CLINIC 173 | Facility: CLINIC | Age: 44
Setting detail: DERMATOLOGY
End: 2024-01-12

## 2024-01-12 DIAGNOSIS — Z41.9 ENCOUNTER FOR PROCEDURE FOR PURPOSES OTHER THAN REMEDYING HEALTH STATE, UNSPECIFIED: ICD-10-CM | Status: IMPROVED

## 2024-01-12 PROCEDURE — ? SCITON BBL

## 2024-01-12 ASSESSMENT — LOCATION DETAILED DESCRIPTION DERM
LOCATION DETAILED: RIGHT INFERIOR CENTRAL MALAR CHEEK
LOCATION DETAILED: MIDDLE STERNUM
LOCATION DETAILED: LEFT INFERIOR ANTERIOR NECK

## 2024-01-12 ASSESSMENT — LOCATION SIMPLE DESCRIPTION DERM
LOCATION SIMPLE: RIGHT CHEEK
LOCATION SIMPLE: LEFT ANTERIOR NECK
LOCATION SIMPLE: CHEST

## 2024-01-12 ASSESSMENT — LOCATION ZONE DERM
LOCATION ZONE: NECK
LOCATION ZONE: TRUNK
LOCATION ZONE: FACE

## 2024-01-12 NOTE — PROCEDURE: SCITON BBL
Cooling (In C): 20
Price (Use Numbers Only, No Special Characters Or $): 934
Hide Repetition Rate?: No
Fluence (J/Cm2): 8
Cooling ?: Yes
Repetition Rate (Hz): 2
Cooling (In C): 25
Spot Size: Finesse Adapter Size: 15 x 45 mm (No Finesse Adapter)
Pulse Duration: 4
Passes: 1
Pulse Duration Units: milliseconds
Total Pulses (Optional): 152
Fluence (J/Cm2): 22
Pulse Duration: 10
Fluence (J/Cm2): 7
Anesthesia Volume In Cc: 0
Detail Level: Zone
Spot Size: Finesse Adapter Size: 15 x 15 mm square
Pulse Duration: 3
Preprocedure Text: The treatment areas were thoroughly cleaned. Clear ultrasound gel was applied to the treatment area. The area was treated with no immediate stacking of pulses.
Total Pulses (Optional): 35
Consent: Written consent obtained, risks reviewed including but not limited to crusting, scabbing, blistering, scarring, darker or lighter pigmentary change, bruising, and/or incomplete response.
Total Pulses (Optional): 125
Post Procedure Text: The patient tolerated the procedure well. Ice-chilled washclothes were applied to the treatment area for comfort. Post care was reviewed with the patient.
Spot Size: Finesse Adapter Size: 7 mm round
Total Pulses (Optional): 166
Total Pulses (Optional): 48
Post-Care Instructions: I reviewed with the patient in detail post-care instructions. Patient should stay away from the sun and wear sun protection until treated areas are fully healed.

## 2024-03-01 ENCOUNTER — APPOINTMENT (RX ONLY)
Dept: URBAN - METROPOLITAN AREA CLINIC 173 | Facility: CLINIC | Age: 44
Setting detail: DERMATOLOGY
End: 2024-03-01

## 2024-03-01 DIAGNOSIS — Z41.9 ENCOUNTER FOR PROCEDURE FOR PURPOSES OTHER THAN REMEDYING HEALTH STATE, UNSPECIFIED: ICD-10-CM

## 2024-03-01 PROCEDURE — ? SCITON BBL

## 2024-03-01 ASSESSMENT — LOCATION ZONE DERM: LOCATION ZONE: FACE

## 2024-03-01 ASSESSMENT — LOCATION DETAILED DESCRIPTION DERM: LOCATION DETAILED: RIGHT INFERIOR CENTRAL MALAR CHEEK

## 2024-03-01 ASSESSMENT — LOCATION SIMPLE DESCRIPTION DERM: LOCATION SIMPLE: RIGHT CHEEK

## 2024-03-01 NOTE — HPI: COSMETIC (LASER RED SPOTS)
previous_has_had_previous_treatments
Number Of Treatments: 2
When Was Your Last Laser Treatment?: 1/12/24

## 2024-03-01 NOTE — PROCEDURE: SCITON BBL
Cooling (In C): 20
Price (Use Numbers Only, No Special Characters Or $): 204
Hide Repetition Rate?: No
Fluence (J/Cm2): 8
Cooling ?: Yes
Repetition Rate (Hz): 2
Cooling (In C): 25
Spot Size: Finesse Adapter Size: 15 x 45 mm (No Finesse Adapter)
Pulse Duration: 4
Passes: 1
Pulse Duration Units: milliseconds
Total Pulses (Optional): 128
Treatment Number: 3
Pulse Duration: 10
Fluence (J/Cm2): 7
Anesthesia Volume In Cc: 0
Detail Level: Zone
Spot Size: Finesse Adapter Size: 15 x 15 mm square
Preprocedure Text: The treatment areas were thoroughly cleaned. Clear ultrasound gel was applied to the treatment area. The area was treated with no immediate stacking of pulses.
Total Pulses (Optional): 47
Consent: Written consent obtained, risks reviewed including but not limited to crusting, scabbing, blistering, scarring, darker or lighter pigmentary change, bruising, and/or incomplete response.
Post Procedure Text: The patient tolerated the procedure well. Ice-chilled washclothes were applied to the treatment area for comfort. Post care was reviewed with the patient.
Spot Size: Finesse Adapter Size: 7 mm round
Total Pulses (Optional): 166
Post-Care Instructions: I reviewed with the patient in detail post-care instructions. Patient should stay away from the sun and wear sun protection until treated areas are fully healed.

## 2024-03-27 ENCOUNTER — APPOINTMENT (RX ONLY)
Dept: URBAN - METROPOLITAN AREA CLINIC 173 | Facility: CLINIC | Age: 44
Setting detail: DERMATOLOGY
End: 2024-03-27

## 2024-03-27 DIAGNOSIS — L57.8 OTHER SKIN CHANGES DUE TO CHRONIC EXPOSURE TO NONIONIZING RADIATION: ICD-10-CM

## 2024-03-27 DIAGNOSIS — Z41.9 ENCOUNTER FOR PROCEDURE FOR PURPOSES OTHER THAN REMEDYING HEALTH STATE, UNSPECIFIED: ICD-10-CM

## 2024-03-27 PROCEDURE — ? BOTOX

## 2024-03-27 PROCEDURE — ? PRESCRIPTION

## 2024-03-27 RX ORDER — PHARMACY COMPOUNDING ACCESSORY
EACH MISCELLANEOUS
Qty: 30 | Refills: 3 | Status: ERX | COMMUNITY
Start: 2024-03-27

## 2024-03-27 RX ADMIN — Medication: at 00:00

## 2024-03-27 NOTE — PROCEDURE: BOTOX
Show Anterior Platysmal Band Units: Yes
Additional Area 4 Units: 0
Incrementing Botox Units: By 0.5 Units
Additional Area 1 Units: 39
Show Right And Left Pupillary Line Units: No
Additional Area 3 Location: masseters
Detail Level: Zone
Expiration Date (Month Year): 05/2026
Price (Use Numbers Only, No Special Characters Or $): 228
Additional Area 2 Location: Upper cutaneous lip
Consent: Written consent obtained. Risks include but not limited to lid/brow ptosis, bruising, swelling, diplopia, temporary effect, incomplete chemical denervation.
Post-Care Instructions: Patient instructed to not lie down for 4 hours and limit physical activity for 24 hours. Patient instructed not to travel by airplane for 48 hours.
Lot #: [id number]
Dilution (U/0.1 Cc): 1
Additional Area 1 Location: Face

## 2024-07-10 ENCOUNTER — APPOINTMENT (RX ONLY)
Dept: URBAN - METROPOLITAN AREA CLINIC 173 | Facility: CLINIC | Age: 44
Setting detail: DERMATOLOGY
End: 2024-07-10

## 2024-07-10 DIAGNOSIS — Z41.9 ENCOUNTER FOR PROCEDURE FOR PURPOSES OTHER THAN REMEDYING HEALTH STATE, UNSPECIFIED: ICD-10-CM

## 2024-07-10 PROCEDURE — ? BOTOX

## 2024-07-10 PROCEDURE — ? COSMETIC CONSULTATION: FILLERS

## 2024-07-10 NOTE — PROCEDURE: BOTOX
Additional Area 5 Units: 0
Show Lateral Platysmal Band Units: Yes
Detail Level: Zone
Price (Use Numbers Only, No Special Characters Or $): 882
Expiration Date (Month Year): 06/2026
Additional Area 2 Location: Upper cutaneous lip
Show Right And Left Brow Units: No
Incrementing Botox Units: By 0.5 Units
Additional Area 1 Location: Face
Additional Area 3 Location: masseters
Additional Area 1 Units: 46
Dilution (U/0.1 Cc): 1
Lot #: [id number]
Post-Care Instructions: Patient instructed to not lie down for 4 hours and limit physical activity for 24 hours. Patient instructed not to travel by airplane for 48 hours.
Consent: Written consent obtained. Risks include but not limited to lid/brow ptosis, bruising, swelling, diplopia, temporary effect, incomplete chemical denervation.

## 2025-01-10 ENCOUNTER — RX ONLY (RX ONLY)
Age: 45
End: 2025-01-10

## 2025-01-10 RX ORDER — LIDOCAINE AND PRILOCAINE 25; 25 MG/G; MG/G
CREAM TOPICAL
Qty: 30 | Refills: 3 | Status: ERX | COMMUNITY
Start: 2025-01-10

## 2025-01-14 ENCOUNTER — APPOINTMENT (OUTPATIENT)
Dept: URBAN - METROPOLITAN AREA CLINIC 173 | Facility: CLINIC | Age: 45
Setting detail: DERMATOLOGY
End: 2025-01-14

## 2025-01-14 DIAGNOSIS — Z41.9 ENCOUNTER FOR PROCEDURE FOR PURPOSES OTHER THAN REMEDYING HEALTH STATE, UNSPECIFIED: ICD-10-CM

## 2025-01-14 DIAGNOSIS — L57.8 OTHER SKIN CHANGES DUE TO CHRONIC EXPOSURE TO NONIONIZING RADIATION: ICD-10-CM

## 2025-01-14 PROCEDURE — ? BOTOX

## 2025-01-14 PROCEDURE — ? PATIENT SPECIFIC COUNSELING

## 2025-01-14 PROCEDURE — ? FILLERS

## 2025-01-14 NOTE — PROCEDURE: BOTOX
Glabellar Complex Units: 0
Show Additional Area 3: Yes
Expiration Date (Month Year): 3-27
Additional Area 1 Units: 50
Price (Use Numbers Only, No Special Characters Or $): 873
Detail Level: Zone
Show Ucl Units: No
Consent: Written consent obtained. Risks include but not limited to lid/brow ptosis, bruising, swelling, diplopia, temporary effect, incomplete chemical denervation.
Additional Area 3 Location: masseters
Post-Care Instructions: Patient instructed to not lie down for 4 hours and limit physical activity for 24 hours. Patient instructed not to travel by airplane for 48 hours.
Lot #: [id number]
Dilution (U/0.1 Cc): 1
Additional Area 2 Location: Upper cutaneous lip
Incrementing Botox Units: By 0.5 Units
Additional Area 1 Location: Face

## 2025-01-14 NOTE — PROCEDURE: FILLERS
Include Cannula Size?: 25G
Anesthesia Volume In Cc: 0.4
Additional Area 5 Volume In Cc: 0
Topical Anesthesia?: 2.5% lidocaine, 2.5% prilocaine
Additional Area 1 Location: Face
Additional Area 1 Volume In Cc: 0.8
Consent: Written consent obtained. Risks include but not limited to bruising, beading, irregular texture, ulceration, infection, allergic reaction, scar formation, incomplete augmentation, temporary nature, procedural pain.
Additional Area 2 Location: Hands
Post-Care Instructions: Patient instructed to apply ice to reduce swelling.
Include Cannula Length?: 1.5 inch
Filler: RHA 2
Include Cannula Information In Note?: Yes
Lot #: [id number]
Additional Area 1 Volume In Cc: 1
Additional Area 3 Location: chin
Detail Level: Zone
Include Cannula Information In Note?: No
Additional Area 2 Location: ear lobes
Price (Use Numbers Only, No Special Characters Or $): 1800
Expiration Date (Month Year): 08/31/2020
Vermilion Lips Filler Volume In Cc: 0.2
Map Statment: See Attach Map for Complete Details
Filler: RHA 3
Lot #: [id number]
Aspiration Statement: Aspiration was performed prior to injecting site with filler.
Expiration Date (Month Year): 3-2-27
Lot #: [id number]
Anesthesia Type: 2% lidocaine with epinephrine and a 1:12 solution of 8.4% sodium bicarbonate
Expiration Date (Month Year): 2-13-37

## 2025-05-14 ENCOUNTER — APPOINTMENT (OUTPATIENT)
Dept: URBAN - METROPOLITAN AREA CLINIC 173 | Facility: CLINIC | Age: 45
Setting detail: DERMATOLOGY
End: 2025-05-14

## 2025-05-14 DIAGNOSIS — Z41.9 ENCOUNTER FOR PROCEDURE FOR PURPOSES OTHER THAN REMEDYING HEALTH STATE, UNSPECIFIED: ICD-10-CM

## 2025-05-14 PROCEDURE — ? BOTOX

## 2025-05-14 NOTE — PROCEDURE: BOTOX
Mentalis Units: 0
Expiration Date (Month Year): 6/27
Show Depressor Anguli Units: Yes
Price (Use Numbers Only, No Special Characters Or $): 047
Additional Area 1 Units: 48
Show Mentalis Units: No
Consent: Written consent obtained. Risks include but not limited to lid/brow ptosis, bruising, swelling, diplopia, temporary effect, incomplete chemical denervation.
Additional Area 3 Location: masseters
Post-Care Instructions: Patient instructed to not lie down for 4 hours and limit physical activity for 24 hours. Patient instructed not to travel by airplane for 48 hours.
Incrementing Botox Units: By 0.5 Units
Lot #: [id number]
Dilution (U/0.1 Cc): 1
Additional Area 2 Location: Upper cutaneous lip
Detail Level: Zone
Additional Area 1 Location: Face